# Patient Record
Sex: FEMALE | Race: BLACK OR AFRICAN AMERICAN | NOT HISPANIC OR LATINO | URBAN - METROPOLITAN AREA
[De-identification: names, ages, dates, MRNs, and addresses within clinical notes are randomized per-mention and may not be internally consistent; named-entity substitution may affect disease eponyms.]

---
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
## 2024-05-17 NOTE — PROCEDURE: PRESCRIPTION MEDICATION MANAGEMENT
Call placed to patient to discuss which medications need to be sent to [facility]. Please notify LPN/MA of return call via secure group chat: [facility] [id number] [facility].  If no answer in group chat please route a message to nursing pool.   
Medication: Semaglutide, 2 MG/DOSE, (Ozempic, 2 MG/DOSE,) 8 MG/3ML Solution Pen-injecto  passed protocol.   Last office visit date: 02/20/2024  Next appointment scheduled?: Yes, 06/05/2024  Number of refills given: 0, current refills on file at [facility] - [place], [place] - [address] [place] [address]  Patient switching pharmacy?  Routed to provider's pool to review, thanks.       
Patient called and said to send all meds to [facility]  Sent for patient.  
Continue Regimen: Diflucan- take 1 tab po for a week x4 weeks\\nKetoconazole shampo-Apply to forehead let sit 10 mins, then rinse 3 nights a week
Render In Strict Bullet Format?: No
Detail Level: Zone
Continue Regimen: Kojic Acid compound.
Continue Regimen: Epiduo forte- apply pea sized amount to face QHS